# Patient Record
Sex: FEMALE | Employment: OTHER | URBAN - METROPOLITAN AREA
[De-identification: names, ages, dates, MRNs, and addresses within clinical notes are randomized per-mention and may not be internally consistent; named-entity substitution may affect disease eponyms.]

---

## 2017-04-01 ENCOUNTER — HOSPITAL ENCOUNTER (EMERGENCY)
Age: 37
Discharge: HOME | End: 2017-04-02
Attending: EMERGENCY MEDICINE
Payer: COMMERCIAL

## 2017-04-01 DIAGNOSIS — G43.809 OTHER MIGRAINE WITHOUT STATUS MIGRAINOSUS, NOT INTRACTABLE: Primary | ICD-10-CM

## 2017-04-01 PROCEDURE — 99284 EMERGENCY DEPT VISIT MOD MDM: CPT

## 2017-04-01 PROCEDURE — 96375 TX/PRO/DX INJ NEW DRUG ADDON: CPT

## 2017-04-01 PROCEDURE — 96374 THER/PROPH/DIAG INJ IV PUSH: CPT

## 2017-04-01 PROCEDURE — 96361 HYDRATE IV INFUSION ADD-ON: CPT

## 2017-04-01 RX ORDER — ONDANSETRON 2 MG/ML
4 INJECTION INTRAMUSCULAR; INTRAVENOUS ONCE
Status: DISCONTINUED | OUTPATIENT
Start: 2017-04-01 | End: 2017-04-01

## 2017-04-01 RX ORDER — KETOROLAC TROMETHAMINE 30 MG/ML
15 INJECTION, SOLUTION INTRAMUSCULAR; INTRAVENOUS ONCE
Status: COMPLETED | OUTPATIENT
Start: 2017-04-01 | End: 2017-04-01

## 2017-04-01 RX ORDER — METOCLOPRAMIDE HYDROCHLORIDE 5 MG/ML
5 INJECTION INTRAMUSCULAR; INTRAVENOUS ONCE
Status: COMPLETED | OUTPATIENT
Start: 2017-04-01 | End: 2017-04-01

## 2017-04-01 RX ORDER — DIPHENHYDRAMINE HYDROCHLORIDE 50 MG/ML
25 INJECTION INTRAMUSCULAR; INTRAVENOUS ONCE
Status: COMPLETED | OUTPATIENT
Start: 2017-04-01 | End: 2017-04-01

## 2017-04-02 VITALS
HEIGHT: 62 IN | RESPIRATION RATE: 14 BRPM | WEIGHT: 105 LBS | SYSTOLIC BLOOD PRESSURE: 106 MMHG | OXYGEN SATURATION: 99 % | TEMPERATURE: 98 F | HEART RATE: 58 BPM | BODY MASS INDEX: 19.32 KG/M2 | DIASTOLIC BLOOD PRESSURE: 67 MMHG

## 2017-04-02 RX ORDER — ONDANSETRON 4 MG/1
4 TABLET, ORALLY DISINTEGRATING ORAL EVERY 4 HOURS PRN
Qty: 10 TABLET | Refills: 0 | Status: SHIPPED | OUTPATIENT
Start: 2017-04-02 | End: 2017-04-09

## 2017-04-02 RX ORDER — BUTALBITAL, ACETAMINOPHEN AND CAFFEINE 50; 325; 40 MG/1; MG/1; MG/1
1-2 TABLET ORAL EVERY 4 HOURS PRN
Qty: 20 TABLET | Refills: 0 | Status: SHIPPED | OUTPATIENT
Start: 2017-04-02 | End: 2017-04-09

## 2017-04-02 NOTE — ED INITIAL ASSESSMENT (HPI)
PT C/O migraine headache x 30 min, states \"I'm very nauseas\" Pt took 2 advil and 2 migraine x pills before arrival.

## 2017-04-02 NOTE — ED PROVIDER NOTES
Patient Seen in: THE [facility] Emergency Department In [place]    History   Patient presents with:  Headache (neurologic)    Stated Complaint: headache    HPI    This is a very pleasant 68-year-old female with past medical history of migraines who presents w 98.1 °F (36.7 °C)   Temp src --    SpO2 04/01/17 2309 100 %   O2 Device 04/01/17 2309 None (Room air)       Current:/67 mmHg  Pulse 58  Temp(Src) 98.1 °F (36.7 °C)  Resp 14  Ht 157.5 cm (5' 2\")  Wt 47.628 kg  BMI 19.20 kg/m2  SpO2 99%  LMP 03/27/201 Dispersible  Take 1 tablet (4 mg total) by mouth every 4 (four) hours as needed for Nausea., Script printed, Disp-10 tablet, R-0

## 2019-01-29 ENCOUNTER — OFFICE VISIT (OUTPATIENT)
Dept: INTERNAL MEDICINE CLINIC | Facility: CLINIC | Age: 39
End: 2019-01-29
Payer: COMMERCIAL

## 2019-01-29 VITALS
RESPIRATION RATE: 12 BRPM | HEIGHT: 63 IN | SYSTOLIC BLOOD PRESSURE: 108 MMHG | WEIGHT: 106.88 LBS | BODY MASS INDEX: 18.94 KG/M2 | TEMPERATURE: 99 F | HEART RATE: 92 BPM | DIASTOLIC BLOOD PRESSURE: 70 MMHG

## 2019-01-29 DIAGNOSIS — J02.9 SORE THROAT: ICD-10-CM

## 2019-01-29 DIAGNOSIS — J02.0 STREP PHARYNGITIS: ICD-10-CM

## 2019-01-29 LAB
CONTROL LINE PRESENT WITH A CLEAR BACKGROUND (YES/NO): YES YES/NO
STREP GRP A CUL-SCR: POSITIVE

## 2019-01-29 PROCEDURE — 99203 OFFICE O/P NEW LOW 30 MIN: CPT | Performed by: INTERNAL MEDICINE

## 2019-01-29 PROCEDURE — 87880 STREP A ASSAY W/OPTIC: CPT | Performed by: INTERNAL MEDICINE

## 2019-01-29 PROCEDURE — 87147 CULTURE TYPE IMMUNOLOGIC: CPT | Performed by: INTERNAL MEDICINE

## 2019-01-29 PROCEDURE — 87081 CULTURE SCREEN ONLY: CPT | Performed by: INTERNAL MEDICINE

## 2019-01-29 RX ORDER — PENICILLIN V POTASSIUM 500 MG/1
500 TABLET ORAL 3 TIMES DAILY
Qty: 30 TABLET | Refills: 0 | Status: SHIPPED | OUTPATIENT
Start: 2019-01-29 | End: 2019-08-29

## 2019-01-29 NOTE — PROGRESS NOTES
[facility]    CHIEF COMPLAINT:  Patient presents with:  Sore Throat: sx since last night. Hasn't seen PCP since 2016, Dr. Babmi Lambert. Ear Pain  Body ache and/or chills: no fever. Eye Problem: right was red with crusting. sx started 4 days ago.  H Service: Not Asked        Blood Transfusions: Not Asked        Caffeine Concern: No        Occupational Exposure: Not Asked        Hobby Hazards: Not Asked        Sleep Concern: Not Asked        Stress Concern: Not Asked        Weight Concern: Not Asked pharyngitis  - penicillin v potassium 500 MG Oral Tab; Take 1 tablet (500 mg total) by mouth 3 (three) times daily. Dispense: 30 tablet; Refill: 0    2.  Sore throat  - STREP A ASSAY W/OPTIC  - GRP A STREP CULT, THROAT; Future  - SPECIMEN HANDLING,DR OFF->

## 2019-08-29 ENCOUNTER — TELEPHONE (OUTPATIENT)
Dept: INTERNAL MEDICINE CLINIC | Facility: CLINIC | Age: 39
End: 2019-08-29

## 2019-08-29 ENCOUNTER — OFFICE VISIT (OUTPATIENT)
Dept: INTERNAL MEDICINE CLINIC | Facility: CLINIC | Age: 39
End: 2019-08-29
Payer: COMMERCIAL

## 2019-08-29 VITALS
SYSTOLIC BLOOD PRESSURE: 110 MMHG | TEMPERATURE: 98 F | DIASTOLIC BLOOD PRESSURE: 70 MMHG | HEART RATE: 72 BPM | RESPIRATION RATE: 12 BRPM | WEIGHT: 108.63 LBS | HEIGHT: 63 IN | BODY MASS INDEX: 19.25 KG/M2

## 2019-08-29 DIAGNOSIS — R19.4 CHANGE IN BOWEL HABITS: ICD-10-CM

## 2019-08-29 DIAGNOSIS — Z00.00 PHYSICAL EXAM, ANNUAL: ICD-10-CM

## 2019-08-29 DIAGNOSIS — R19.8 GLOBUS SENSATION: Primary | ICD-10-CM

## 2019-08-29 DIAGNOSIS — M25.572 ACUTE LEFT ANKLE PAIN: ICD-10-CM

## 2019-08-29 DIAGNOSIS — M25.552 LEFT HIP PAIN: ICD-10-CM

## 2019-08-29 PROCEDURE — 99214 OFFICE O/P EST MOD 30 MIN: CPT | Performed by: INTERNAL MEDICINE

## 2019-08-29 NOTE — PROGRESS NOTES
[facility]    CHIEF COMPLAINT:  Patient presents with:  Hip Pain: left hip x 3 weeks                  No pap or mammogram in chart. Hasn't seen gyne 8-9 years  Foot Pain: left foot x 3 weeks.    Throat Problem: dryness with pressure sensation in t well nourished,in no apparent distress  SKIN: no rashes, no excessive dryness. HEENT: Oropharynx normal. No tonsillar enlargement or exudates. EYES:PERRLA, EOMI,conjunctiva are clear  NECK: supple,no adenopathy. Thyroid not enlarged.    LUNGS: clear to PANEL  - HEMOGLOBIN A1C  - TSH W REFLEX TO FREE T4          Return in about 1 month (around 9/29/2019) for physical, follow up.       Jonny Velasquez MD

## 2019-08-29 NOTE — TELEPHONE ENCOUNTER
Called and spoke with patient regarding her message. Patient reports a buzzing/vibrating sensation in her rectum and \"further up\". Patient reports this sensation is intermittent, which was why she forgot to mention it in her visit today with Dr. Samira Hardy.  Pa

## 2019-08-29 NOTE — TELEPHONE ENCOUNTER
Pt forgot to mention what she called the main reason for her visit today. States that she feels a strange vibration in her rectum, comes and goes, feels like a cell phone vibrating. Please advise.  Thank you

## 2021-03-08 ENCOUNTER — OFFICE VISIT (OUTPATIENT)
Dept: INTERNAL MEDICINE CLINIC | Facility: CLINIC | Age: 41
End: 2021-03-08
Payer: COMMERCIAL

## 2021-03-08 ENCOUNTER — TELEPHONE (OUTPATIENT)
Dept: INTERNAL MEDICINE CLINIC | Facility: CLINIC | Age: 41
End: 2021-03-08

## 2021-03-08 VITALS
RESPIRATION RATE: 16 BRPM | HEIGHT: 63 IN | OXYGEN SATURATION: 98 % | DIASTOLIC BLOOD PRESSURE: 84 MMHG | HEART RATE: 93 BPM | BODY MASS INDEX: 20.49 KG/M2 | SYSTOLIC BLOOD PRESSURE: 118 MMHG | WEIGHT: 115.63 LBS | TEMPERATURE: 97 F

## 2021-03-08 DIAGNOSIS — Z13.220 SCREENING FOR CHOLESTEROL LEVEL: ICD-10-CM

## 2021-03-08 DIAGNOSIS — R07.9 CHEST PAIN, UNSPECIFIED TYPE: Primary | ICD-10-CM

## 2021-03-08 DIAGNOSIS — Z13.29 SCREENING FOR THYROID DISORDER: ICD-10-CM

## 2021-03-08 PROCEDURE — 3008F BODY MASS INDEX DOCD: CPT | Performed by: NURSE PRACTITIONER

## 2021-03-08 PROCEDURE — 99214 OFFICE O/P EST MOD 30 MIN: CPT | Performed by: NURSE PRACTITIONER

## 2021-03-08 PROCEDURE — 93000 ELECTROCARDIOGRAM COMPLETE: CPT | Performed by: NURSE PRACTITIONER

## 2021-03-08 PROCEDURE — 3079F DIAST BP 80-89 MM HG: CPT | Performed by: NURSE PRACTITIONER

## 2021-03-08 PROCEDURE — 3074F SYST BP LT 130 MM HG: CPT | Performed by: NURSE PRACTITIONER

## 2021-03-08 RX ORDER — BIOTIN 10000 MCG
2 CAPSULE ORAL DAILY
COMMUNITY

## 2021-03-08 RX ORDER — OMEGA-3/DHA/EPA/FISH OIL 60 MG-90MG
1 CAPSULE ORAL DAILY
COMMUNITY

## 2021-03-08 NOTE — TELEPHONE ENCOUNTER
Since we have advised pt and she is aware of her risks of delaying care in the event this is a cardiac event and she is refusing to go to the ER okay to schedule an appointment with me or Jesse Guadarrama for today.

## 2021-03-08 NOTE — PROGRESS NOTES
Tavo Galvan is a 41year old female. CHIEF COMPLAINT   Chest pain    HPI:   The patient reports chest pain for one month intermittently. One month ago was stabbing pain to left chest 5/10, lasted no more than ten minutes, then went to a 0/10.  Is ha Size: adult)   Pulse 93   Temp 97.3 °F (36.3 °C) (Temporal)   Resp 16   Ht 5' 3\" (1.6 m)   Wt 115 lb 9.6 oz (52.4 kg)   LMP 03/02/2021 (Approximate)   SpO2 98%   BMI 20.48 kg/m²   Body mass index is 20.48 kg/m².    GENERAL: well developed, well nourished, FREE T4    3. Screening for cholesterol level  - LIPID PANEL    The patient indicates understanding of these issues and agrees to the plan.   The patient is asked to return in one week    Go to ER if symptoms worsen

## 2021-03-08 NOTE — TELEPHONE ENCOUNTER
Pt called insistent on OV for intermittent left sided sharp chest pain that last 4-5 minutes each episode. Last episode was earlier today.   Occassionally will have numbness at left hand when occurs, has had numbness at left arm in past and middle back pain

## 2021-03-08 NOTE — PATIENT INSTRUCTIONS
Get your tests completed    Get your labs done. You should be fasting for at least 10 hours. If you take a multivitamin with Biotin or any biotin product it should be held for 3 days prior to getting your labs done.     Follow up in one week     Go to ER if

## 2021-04-19 ENCOUNTER — OFFICE VISIT (OUTPATIENT)
Dept: INTERNAL MEDICINE CLINIC | Facility: CLINIC | Age: 41
End: 2021-04-19
Payer: COMMERCIAL

## 2021-04-19 VITALS
BODY MASS INDEX: 20.11 KG/M2 | WEIGHT: 113.5 LBS | RESPIRATION RATE: 12 BRPM | TEMPERATURE: 99 F | HEIGHT: 63 IN | HEART RATE: 72 BPM | SYSTOLIC BLOOD PRESSURE: 110 MMHG | DIASTOLIC BLOOD PRESSURE: 70 MMHG

## 2021-04-19 DIAGNOSIS — Z12.31 ENCOUNTER FOR SCREENING MAMMOGRAM FOR BREAST CANCER: ICD-10-CM

## 2021-04-19 DIAGNOSIS — H92.01 RIGHT EAR PAIN: Primary | ICD-10-CM

## 2021-04-19 PROCEDURE — 3074F SYST BP LT 130 MM HG: CPT | Performed by: INTERNAL MEDICINE

## 2021-04-19 PROCEDURE — 3078F DIAST BP <80 MM HG: CPT | Performed by: INTERNAL MEDICINE

## 2021-04-19 PROCEDURE — 3008F BODY MASS INDEX DOCD: CPT | Performed by: INTERNAL MEDICINE

## 2021-04-19 PROCEDURE — 99213 OFFICE O/P EST LOW 20 MIN: CPT | Performed by: INTERNAL MEDICINE

## 2021-04-19 NOTE — PROGRESS NOTES
[facility]    CHIEF COMPLAINT:  Patient presents with:  Ear Problem: shooting pain in right ear. Sx started yesterday. Had dental procedure last Wednesday. Was taking Amox x 10 days. Hasn't seen gyne in 8 years. no pap.  No mammo        HISTORY OF developed, well nourished,in no apparent distress  HEENT: TMs normal bilaterally, right ear canal is normal, no erythema, no swelling, no injury.      DATA:  Results for orders placed or performed in visit on 01/29/19   STREP A ASSAY W/OPTIC   Result Value

## 2022-01-10 ENCOUNTER — TELEPHONE (OUTPATIENT)
Dept: INTERNAL MEDICINE CLINIC | Facility: CLINIC | Age: 42
End: 2022-01-10

## 2022-01-10 ENCOUNTER — TELEMEDICINE (OUTPATIENT)
Dept: INTERNAL MEDICINE CLINIC | Facility: CLINIC | Age: 42
End: 2022-01-10

## 2022-01-10 DIAGNOSIS — L65.9 HAIR LOSS: ICD-10-CM

## 2022-01-10 DIAGNOSIS — Z00.00 PHYSICAL EXAM, ANNUAL: ICD-10-CM

## 2022-01-10 DIAGNOSIS — H10.9 CONJUNCTIVITIS OF RIGHT EYE, UNSPECIFIED CONJUNCTIVITIS TYPE: ICD-10-CM

## 2022-01-10 DIAGNOSIS — B00.1 COLD SORE: Primary | ICD-10-CM

## 2022-01-10 PROCEDURE — 99214 OFFICE O/P EST MOD 30 MIN: CPT | Performed by: INTERNAL MEDICINE

## 2022-01-10 RX ORDER — ACYCLOVIR 50 MG/G
1 CREAM TOPICAL 3 TIMES DAILY
Qty: 5 G | Refills: 0 | Status: SHIPPED | OUTPATIENT
Start: 2022-01-10 | End: 2022-01-17

## 2022-01-10 RX ORDER — CIPROFLOXACIN HYDROCHLORIDE 3.5 MG/ML
2 SOLUTION/ DROPS TOPICAL 3 TIMES DAILY
Qty: 1 EACH | Refills: 0 | Status: SHIPPED | OUTPATIENT
Start: 2022-01-10

## 2022-01-10 NOTE — PROGRESS NOTES
Virtual Telephone Check-In    Brian Martinez verbally consents to a Virtual/Telephone Check-In visit on 01/10/22. Patient has been referred to the [facility] website at [email or web address] to review the yearly Consent to Treat document.     Patient unde Ophthalmic Solution Place 2 drops into both eyes 3 (three) times daily. 1 each 0   • Biotin 10 MG Oral Cap Take 2 capsules by mouth daily. • Omega-3 Fatty Acids (FISH OIL) 500 MG Oral Cap Take 1 capsule by mouth daily.      • B COMPLEX-C OR Take 1 table WITH PLATELET  - COMP METABOLIC PANEL (14)  - LIPID PANEL  - HEMOGLOBIN A1C  - TSH W REFLEX TO FREE T4  - VITAMIN D, 25-HYDROXY        Pt understands phone/video evaluation is not a substitute for face to face examination or emergency care.  Pt advised to g

## 2022-01-10 NOTE — TELEPHONE ENCOUNTER
Virtual/Telephone Consent    Manjinderpat Lyons verbally {consents to a Virtual/Telephone Check-In service on 1/10/22. Patient understands and accepts financial responsibility for any deductible, co-insurance and/or co-pays associated with this service.

## 2022-01-15 ENCOUNTER — TELEPHONE (OUTPATIENT)
Dept: INTERNAL MEDICINE CLINIC | Facility: CLINIC | Age: 42
End: 2022-01-15

## 2022-01-15 NOTE — TELEPHONE ENCOUNTER
Incoming (mail or fax):   Fax  Received from:  [facility]   Documentation given to:  Triage incoming bin      Denial of Request

## 2022-01-17 RX ORDER — DOCOSANOL 100 MG/G
1 CREAM TOPICAL 3 TIMES DAILY
Qty: 2 G | Refills: 0 | Status: SHIPPED | OUTPATIENT
Start: 2022-01-17

## 2022-01-18 NOTE — TELEPHONE ENCOUNTER
Left detailed message on pt's cell, ok per HIPAA. Made aware of denial for acyclovir & the substitution for abreva.   Informed rx was sent yesterday to [facility].

## 2022-04-22 ENCOUNTER — TELEPHONE (OUTPATIENT)
Dept: INTERNAL MEDICINE CLINIC | Facility: CLINIC | Age: 42
End: 2022-04-22

## 2022-05-18 ENCOUNTER — TELEMEDICINE (OUTPATIENT)
Dept: INTERNAL MEDICINE CLINIC | Facility: CLINIC | Age: 42
End: 2022-05-18
Payer: COMMERCIAL

## 2022-05-18 DIAGNOSIS — H00.015 HORDEOLUM EXTERNUM OF LEFT LOWER EYELID: Primary | ICD-10-CM

## 2022-05-18 PROCEDURE — 99213 OFFICE O/P EST LOW 20 MIN: CPT | Performed by: INTERNAL MEDICINE

## 2022-05-18 RX ORDER — ERYTHROMYCIN 5 MG/G
1 OINTMENT OPHTHALMIC NIGHTLY
Qty: 3.5 G | Refills: 0 | Status: SHIPPED | OUTPATIENT
Start: 2022-05-18

## 2022-05-19 ENCOUNTER — HOSPITAL ENCOUNTER (OUTPATIENT)
Age: 42
Discharge: HOME | End: 2022-05-19
Payer: COMMERCIAL

## 2022-05-19 VITALS
OXYGEN SATURATION: 99 % | HEIGHT: 63 IN | RESPIRATION RATE: 18 BRPM | SYSTOLIC BLOOD PRESSURE: 111 MMHG | DIASTOLIC BLOOD PRESSURE: 73 MMHG | TEMPERATURE: 98 F | HEART RATE: 86 BPM | BODY MASS INDEX: 19.14 KG/M2 | WEIGHT: 108 LBS

## 2022-05-19 DIAGNOSIS — J02.9 VIRAL PHARYNGITIS: Primary | ICD-10-CM

## 2022-05-19 DIAGNOSIS — H00.015 HORDEOLUM EXTERNUM OF LEFT LOWER EYELID: ICD-10-CM

## 2022-05-19 LAB — S PYO AG THROAT QL: NEGATIVE

## 2022-05-19 PROCEDURE — 99213 OFFICE O/P EST LOW 20 MIN: CPT | Performed by: PHYSICIAN ASSISTANT

## 2022-05-19 PROCEDURE — 87880 STREP A ASSAY W/OPTIC: CPT | Performed by: PHYSICIAN ASSISTANT

## 2022-05-20 ENCOUNTER — TELEPHONE (OUTPATIENT)
Dept: INTERNAL MEDICINE CLINIC | Facility: CLINIC | Age: 42
End: 2022-05-20

## 2022-05-20 DIAGNOSIS — H00.019 HORDEOLUM EXTERNUM, UNSPECIFIED LATERALITY: Primary | ICD-10-CM

## 2022-05-20 NOTE — TELEPHONE ENCOUNTER
Patient called in and is requesting an Antibiotic. Had a VV with Shantell Edwards on 5/18/22 and went to [facility] on 5/19/22. Patient is convinced she has Strep Throat even though she was tested yesterday and was Negative, but she is convinced it's Strep throat casue she read on a \"Medical Website\" that the sty and strep are related. She Refused to do a COVID test - because Divine Avani knows what COVID is and she doesn't have it\". Shantell Edwards gave her and antibiotic ointment at [facility] [address] but she says it's not helping and the [facility] deemed her as having a Virus vs Infection and did not provide an antibiotic either. Also asking for a referral to an Eye Doctor for the sty issue.

## 2022-05-20 NOTE — TELEPHONE ENCOUNTER
Okay to place ophthalmology referral.  Please have her make an appointment to be seen with them. Thank you.

## 2022-06-02 ENCOUNTER — OFFICE VISIT (OUTPATIENT)
Dept: INTERNAL MEDICINE CLINIC | Facility: CLINIC | Age: 42
End: 2022-06-02
Payer: COMMERCIAL

## 2022-06-02 VITALS
TEMPERATURE: 98 F | OXYGEN SATURATION: 99 % | DIASTOLIC BLOOD PRESSURE: 60 MMHG | BODY MASS INDEX: 21 KG/M2 | RESPIRATION RATE: 16 BRPM | SYSTOLIC BLOOD PRESSURE: 100 MMHG | WEIGHT: 116.38 LBS | HEART RATE: 92 BPM

## 2022-06-02 DIAGNOSIS — B34.9 ACUTE VIRAL SYNDROME: Primary | ICD-10-CM

## 2022-06-02 DIAGNOSIS — J30.9 ALLERGIC RHINITIS, UNSPECIFIED SEASONALITY, UNSPECIFIED TRIGGER: ICD-10-CM

## 2022-06-02 PROCEDURE — 99214 OFFICE O/P EST MOD 30 MIN: CPT | Performed by: NURSE PRACTITIONER

## 2022-06-02 PROCEDURE — 3074F SYST BP LT 130 MM HG: CPT | Performed by: NURSE PRACTITIONER

## 2022-06-02 PROCEDURE — 3078F DIAST BP <80 MM HG: CPT | Performed by: NURSE PRACTITIONER

## 2022-06-02 RX ORDER — FLUTICASONE PROPIONATE 50 MCG
2 SPRAY, SUSPENSION (ML) NASAL DAILY
Qty: 16 G | Refills: 0 | Status: SHIPPED | OUTPATIENT
Start: 2022-06-02

## 2022-06-02 RX ORDER — CETIRIZINE HYDROCHLORIDE 10 MG/1
10 TABLET ORAL DAILY
Qty: 30 TABLET | Refills: 0 | Status: SHIPPED | OUTPATIENT
Start: 2022-06-02

## 2022-06-02 NOTE — PATIENT INSTRUCTIONS
Take the Zyrtec 10 mg once daily. Start the Flonase nasal spray. Do warm, salt water gargles 2-3 times daily. You can use Robitussin DM or Mucinex DM as directed on the bottle for cough and chest congestion. Use Cepacol for sore throat and dry cough as needed. Use Tylenol as needed for pain or fever. Stay hydrated. Go to ER or call 911 if worsening symptoms such as persistent fever >103, difficulty breathing, or chest pain.     Infection prevention:  - Proper hand hygiene is very important          - Wear a mask in public  - Avoid touching your mouth, nose, eyes, and face  - Practice social distancing and staying 6 ft away from other individuals  - Cough into your arm or a tissue  - Avoid contact with others if you have symptoms of an upper or lower respiratory infection  - Avoid contact with others who are ill  - Avoid direct contact with your pets if you are ill  - Disinfect surfaces with appropriate materials  - If your symptoms become severe (shortness of breath with rest or activity, fever, chest congestion, productive cough), go to the ER  - Recommend self-isolation at home if you are sick, wearing a mask if in room with other family members

## 2024-04-08 ENCOUNTER — TELEPHONE (OUTPATIENT)
Dept: INTERNAL MEDICINE CLINIC | Facility: CLINIC | Age: 44
End: 2024-04-08

## 2024-04-08 ENCOUNTER — PATIENT OUTREACH (OUTPATIENT)
Dept: CASE MANAGEMENT | Age: 44
End: 2024-04-08

## 2024-04-08 NOTE — PROCEDURES
The office order for PCP removal request is Approved and finalized on April 8, 2024.    Thanks,  [facility] Team

## (undated) NOTE — LETTER
04/07/21    Asuncion Friedman  70.    Dear Antione Montgomery,    This is a friendly reminder to let you know you have outstanding lab work as listed below.   To complete your labs, please contact your provider of choice and schedule

## (undated) NOTE — LETTER
09/30/19        [facility]  [address]  [place] [address]      [facility],    Our records indicate that you have outstanding lab work and or testing that was ordered for you and has not yet been completed:  Orders Placed This Encounter

## (undated) NOTE — LETTER
05/19/21        [address]as [address] 70[address]      Dear Manju Vazquez,    Just a friendly reminder, our records indicate that you have outstanding lab work and or testing that was ordered for you and has not yet been completed:

## (undated) NOTE — ED AVS SNAPSHOT
THE [facility] Emergency Department in [address]    Phone:  [phone]    Fax:  [phone]           Khang Perez   MRN: [id number]    Department:  THE [facility] Emergency Department in [place]   Date of Vis IF THERE IS ANY CHANGE OR WORSENING OF YOUR CONDITION, CALL YOUR PRIMARY CARE PHYSICIAN AT ONCE OR RETURN IMMEDIATELY TO THE EMERGENCY DEPARTMENT.     If you have been prescribed any medication(s), please fill your prescription right away and begin taking t